# Patient Record
Sex: MALE | Race: WHITE
[De-identification: names, ages, dates, MRNs, and addresses within clinical notes are randomized per-mention and may not be internally consistent; named-entity substitution may affect disease eponyms.]

---

## 2023-01-21 ENCOUNTER — NURSE TRIAGE (OUTPATIENT)
Dept: OTHER | Facility: CLINIC | Age: 23
End: 2023-01-21

## 2023-01-21 NOTE — TELEPHONE ENCOUNTER
Location of patient: [place]    Subjective: Caller states \"went to [facility] on Wednesday for strep\"     Current Symptoms: headaches, ears feel like they have to pop, some muffled hearing, denies pain, throat pain has resolved    Onset: 1 day ago;     Associated Symptoms: reduced activity    Pain Severity: 2/10; aching; intermittent    Temperature: denies fever     What has been tried: amoxicillin, tylenol sinus    LMP: NA Pregnant: NA    Recommended disposition: See PCP within 3 Days    Care advice provided, patient verbalizes understanding; denies any other questions or concerns; instructed to call back for any new or worsening symptoms. Patient/caller agrees to proceed to nearest [facility] , he states his PCP cannot get him in until end of next week    This triage is a result of a call to [address] [place] [address]. Please do not respond to the triage nurse through this encounter. Any subsequent communication should be directly with the patient.     Reason for Disposition   Ear congestion present > 48 hours    Protocols used: Ear - Congestion-ADULT-

## 2024-04-13 ENCOUNTER — HOSPITAL ENCOUNTER (EMERGENCY)
Age: 24
Discharge: HOME | End: 2024-04-13
Payer: COMMERCIAL

## 2024-04-13 ENCOUNTER — APPOINTMENT (OUTPATIENT)
Dept: GENERAL RADIOLOGY | Age: 24
End: 2024-04-13
Payer: COMMERCIAL

## 2024-04-13 VITALS
TEMPERATURE: 98.6 F | WEIGHT: 150 LBS | HEIGHT: 69 IN | BODY MASS INDEX: 22.22 KG/M2 | OXYGEN SATURATION: 98 % | DIASTOLIC BLOOD PRESSURE: 79 MMHG | RESPIRATION RATE: 16 BRPM | SYSTOLIC BLOOD PRESSURE: 140 MMHG | HEART RATE: 80 BPM

## 2024-04-13 DIAGNOSIS — S83.91XA SPRAIN OF RIGHT KNEE, UNSPECIFIED LIGAMENT, INITIAL ENCOUNTER: Primary | ICD-10-CM

## 2024-04-13 PROCEDURE — 99283 EMERGENCY DEPT VISIT LOW MDM: CPT

## 2024-04-13 PROCEDURE — 73562 X-RAY EXAM OF KNEE 3: CPT

## 2024-04-13 ASSESSMENT — PAIN - FUNCTIONAL ASSESSMENT: PAIN_FUNCTIONAL_ASSESSMENT: 0-10

## 2024-04-13 ASSESSMENT — LIFESTYLE VARIABLES
HOW MANY STANDARD DRINKS CONTAINING ALCOHOL DO YOU HAVE ON A TYPICAL DAY: 1 OR 2
HOW OFTEN DO YOU HAVE A DRINK CONTAINING ALCOHOL: MONTHLY OR LESS

## 2024-04-13 ASSESSMENT — PAIN DESCRIPTION - LOCATION: LOCATION: KNEE

## 2024-04-13 ASSESSMENT — PAIN SCALES - GENERAL: PAINLEVEL_OUTOF10: 5

## 2024-04-13 NOTE — ED PROVIDER NOTES
Independent [facility] Visit.      [facility]  Department of Emergency Medicine   ED  Encounter Note  Admit Date/RoomTime: 2024  7:17 PM  ED Room: 32/    NAME: Weston Samuel  : 2000  MRN: [id number]     Chief Complaint:  Knee Pain (Right knee pain from basketball)    History of Present Illness       Weston Samuel is a 23 y.o. old male who presents to the emergency department by private vehicle, for non-traumatic popping sensation to right knee  which occured 30 minute(s) prior to arrival.  The complaint is due to when playing basketball.  Since onset the symptoms have been remaining constant.  Patient has no prior history of pain/injury with regards to today's visit.  His pain is aggraveated by certain movements or pressure on or palpation of painful area and relieved by nothing, as no treatment has been provided prior to this visit. His weight bearing ability is: difficult secondary to discomfort. He denies any head injury, headache, loss of consciousness, confusion, dizziness, neck pain, chest pain, abdominal pain, back pain, numbness, weakness, blurred vision, nausea, vomiting, fever, chills, wounds, or rash..     ROS   Pertinent positives and negatives are stated within HPI, all other systems reviewed and are negative.    Past Medical History:  has a past medical history of Asthma.    Surgical History:  has no past surgical history on file.    Social History:      Family History: family history is not on file.     Allergies: Patient has no known allergies.    Physical Exam   Oxygen Saturation Interpretation: Normal.        ED Triage Vitals [24 1912]   BP Temp Temp Source Pulse Respirations SpO2 Height Weight - Scale   (!) 140/79 98.6 °F (37 °C) Oral 80 16 98 % 1.753 m (5' 9\") 68 kg (150 lb)         Constitutional:  Alert, development consistent with age.  Neck:  Normal ROM.  Supple.  Physical Exam  Right Knee: medial, lateral            Tenderness:  moderate..             are soft and compressible.  Tenderness upon the medial and lateral aspect.  No obvious deformity.  Distal pulses noted..  Vital signs slightly hypertensive likely due to pain.  Differential diagnoses include but not limited to fracture versus dislocation versus strain. Diagnostic studies including labs and imagining which was interpreted by radiologist reveals x-ray was the right knee was unremarkable after shared decision-making he was placed in a knee immobilizer and crutches and will follow-up outpatient.. Consults included none. Results were discussed with patient.  He states he has crutches at home..  Patient was given ibuprofen and ice for their symptoms with moderate improvement. Patient will be discharged home with the following prescriptions, none.  Discussed appropriate use and potential side effects of starting the prescribed medications. Patient continues to be non-toxic on re-evaluation. Findings were discussed with the patient and reasons to immediately return to the ED were articulated to them. They will follow-up with their PMD and orthopedics.      Discharge Instructions:   Patient referred to  Inna Short MD  [address] [place] [id number] [phone]    Call   to schedule an appt for follow up in 1-2 days    Jonel Cadena DO  [address] [place] [address] [phone]    Call   to schedule an appt for follow up in 1-2 days    [facility] Emergency Department  [address] [facility] [place] [facility] [address] [phone]  Go to   If symptoms worsen      MEDICATIONS:   DISCHARGE MEDICATIONS:  New Prescriptions    No medications on file       DISCONTINUED MEDICATIONS:  Discontinued Medications    No medications on file       Record Review:  Records Reviewed : None       Disposition Considerations:  This patient's ED course included: a personal history and physicial examination, re-evaluation prior to disposition, and multiple bedside

## 2024-08-29 ENCOUNTER — HOSPITAL ENCOUNTER (OUTPATIENT)
Age: 24
Discharge: HOME | End: 2024-08-31

## 2024-09-06 LAB — SURGICAL PATHOLOGY REPORT: NORMAL
